# Patient Record
Sex: MALE | Race: WHITE | URBAN - METROPOLITAN AREA
[De-identification: names, ages, dates, MRNs, and addresses within clinical notes are randomized per-mention and may not be internally consistent; named-entity substitution may affect disease eponyms.]

---

## 2019-03-31 ENCOUNTER — OFFICE VISIT (OUTPATIENT)
Dept: URGENT CARE | Facility: URGENT CARE | Age: 44
End: 2019-03-31
Payer: COMMERCIAL

## 2019-03-31 ENCOUNTER — ANCILLARY PROCEDURE (OUTPATIENT)
Dept: GENERAL RADIOLOGY | Facility: CLINIC | Age: 44
End: 2019-03-31
Attending: INTERNAL MEDICINE
Payer: COMMERCIAL

## 2019-03-31 VITALS
DIASTOLIC BLOOD PRESSURE: 92 MMHG | SYSTOLIC BLOOD PRESSURE: 136 MMHG | TEMPERATURE: 97.4 F | HEART RATE: 90 BPM | OXYGEN SATURATION: 99 % | WEIGHT: 195.8 LBS

## 2019-03-31 DIAGNOSIS — S49.91XA INJURY OF RIGHT SHOULDER, INITIAL ENCOUNTER: Primary | ICD-10-CM

## 2019-03-31 DIAGNOSIS — S49.91XA INJURY OF RIGHT SHOULDER, INITIAL ENCOUNTER: ICD-10-CM

## 2019-03-31 DIAGNOSIS — Z91.81 PERSONAL HISTORY OF FALL: ICD-10-CM

## 2019-03-31 PROCEDURE — 99203 OFFICE O/P NEW LOW 30 MIN: CPT | Performed by: INTERNAL MEDICINE

## 2019-03-31 PROCEDURE — 73030 X-RAY EXAM OF SHOULDER: CPT | Mod: RT

## 2019-03-31 ASSESSMENT — PAIN SCALES - GENERAL: PAINLEVEL: SEVERE PAIN (6)

## 2019-03-31 NOTE — PROGRESS NOTES
SUBJECTIVE:  Sharad Fernandes is an 43 year old male who presents for shoulder pain after a fall.   Missed a couple steps and fell on right shoulder.  Thinks arm was bent and hit his elbow as well, but feels like shoulder is bigger problem.  Fall occurred last night.  Hurt right away and is about the same today.  Hurts more when moves it but some at rest also.  No bruising or swelling noted.  No prior shoulder injuries or surgeries.  No head injury or loc.  No other injuries in fall.  No lightheadedness or dizziness before or after fall.    PMH:  neg    Social History     Socioeconomic History     Marital status: Single     Spouse name: Not on file     Number of children: Not on file     Years of education: Not on file     Highest education level: Not on file   Occupational History     Not on file   Social Needs     Financial resource strain: Not on file     Food insecurity:     Worry: Not on file     Inability: Not on file     Transportation needs:     Medical: Not on file     Non-medical: Not on file   Tobacco Use     Smoking status: Not on file   Substance and Sexual Activity     Alcohol use: Not on file     Drug use: Not on file     Sexual activity: Not on file   Lifestyle     Physical activity:     Days per week: Not on file     Minutes per session: Not on file     Stress: Not on file   Relationships     Social connections:     Talks on phone: Not on file     Gets together: Not on file     Attends [employer] service: Not on file     Active member of club or organization: Not on file     Attends meetings of clubs or organizations: Not on file     Relationship status: Not on file     Intimate partner violence:     Fear of current or ex partner: Not on file     Emotionally abused: Not on file     Physically abused: Not on file     Forced sexual activity: Not on file   Other Topics Concern     Not on file   Social History Narrative     Not on file     No family history on file.    ALLERGIES:  Patient has no  known allergies.    No current outpatient medications on file.     No current facility-administered medications for this visit.          ROS:  ROS is done and is negative for general/constitutional, eye, ENT, Respiratory, cardiovascular, GI, , Skin, musculoskeletal except as noted elsewhere.  All other review of systems negative except as noted elsewhere.      OBJECTIVE:  BP (!) 136/92   Pulse 90   Temp 97.4  F (36.3  C) (Tympanic)   Wt 88.8 kg (195 lb 12.8 oz)   SpO2 99%   GENERAL APPEARANCE: Alert, in no acute distress  EYES: normal  NOSE:normal  OROPHARYNX:normal  NECK:No adenopathy,masses or thyromegaly  RESP: normal and clear to auscultation  CV:regular rate and rhythm and no murmurs, clicks, or gallops  ABDOMEN: Abdomen soft, non-tender. BS normal. No masses, organomegaly  SKIN: right elbow abrasion  MUSCULOSKELETAL: right shoulder - no anterior joint line tenderness.  Not tender to palpation.  No edema or erythema or bruising.  Limited rom due to pain, deirdre with lifting arm above 45 degrees.  Right clavicle is non-tender.  Right elbow with normal rom and no tenderness.  NEURO:  Strength 5/5 and symmetric in bilateral upper  Extremities but not able to test right shoulder.  DTRs 2+ and symmetric in bilateral upper extremities.  Sensation to light touch grossly intact in bilateral upper extremities.    RESULTS  No results found for this or any previous visit..  Recent Results (from the past 48 hour(s))   XR Shoulder Right 2 Views    Narrative    RIGHT SHOULDER TWO VIEWS  3/31/2019 10:30 AM     HISTORY: Injury of right shoulder, initial encounter. Personal history  of fall.    COMPARISON: None.    FINDINGS: No fracture or dislocation is identified. Acromioclavicular  and glenohumeral joints are maintained. Soft tissues are unremarkable.      Impression    IMPRESSION: No acute osseous abnormality.    ROSALIND ELLIS MD       ASSESSMENT/PLAN:    ASSESSMENT / PLAN:  (S49.91XA) Injury of right shoulder, initial  encounter  (primary encounter diagnosis)  Comment: from fall.  Currently no evidence of fracture or dislocation.  Suspect soft tissue injury from fall.  With limited rom, consider possible rotator cuff injury.  Plan: XR Shoulder Right 2 Views         I reviewed supportive care including icing, rest, and use of sling for comfort, otc meds to use if needed, expected course, and signs of concern.  Follow up as needed with ortho if he does not have some improvement within 1 week(s) or full improvement within 2 weeks or if worsens in any way.  Reviewed red flag symptoms and is to go to the ER if experiences any of these.  Pt provided with a sling today.    (Z91.81) Personal history of fall  Comment: fell on stairs.   Plan: XR Shoulder Right 2 Views        Reviewed fall prevention.  Shoulder care as above.      See [facility] for orders, medications, letters, patient instructions    Josefina Crow M.D.